# Patient Record
Sex: MALE | Race: WHITE | Employment: FULL TIME | URBAN - METROPOLITAN AREA
[De-identification: names, ages, dates, MRNs, and addresses within clinical notes are randomized per-mention and may not be internally consistent; named-entity substitution may affect disease eponyms.]

---

## 2024-03-06 ENCOUNTER — HOSPITAL ENCOUNTER (OUTPATIENT)
Age: 44
Discharge: HOME | End: 2024-03-06
Payer: COMMERCIAL

## 2024-03-06 VITALS
TEMPERATURE: 98 F | HEART RATE: 108 BPM | RESPIRATION RATE: 18 BRPM | SYSTOLIC BLOOD PRESSURE: 137 MMHG | DIASTOLIC BLOOD PRESSURE: 89 MMHG | OXYGEN SATURATION: 97 %

## 2024-03-06 DIAGNOSIS — J02.9 VIRAL PHARYNGITIS: Primary | ICD-10-CM

## 2024-03-06 LAB — S PYO AG THROAT QL: NEGATIVE

## 2024-03-06 NOTE — DISCHARGE INSTRUCTIONS
Please take acetaminophen (Tylenol) 650-1000 mg every 6 hours for fever/pain  OR  Ibuprofen (Motrin, Advil) 600 mg every 6 hours for fever/pain, with food  Warm fluids  Throat drops/lozenges e.g. Halls, Cepacol  Warm salt water gargles (1/2 teaspoon of salt to 8 oz of warm water)  Return for any new/worsening symptoms  See your primary care provider if no improvement in 1 week

## 2024-03-06 NOTE — ED PROVIDER NOTES
Chief Complaint   Patient presents with    Sore Throat       HPI:     Shun Richardson is a 43 year old male who presents for evaluation and management of a chief complaint of sore throat for 2 days.  No fever.  No cough.  No nasal congestion.  No chest pain or shortness of breath.  No nausea, vomiting, diarrhea, abdominal pain.  Tolerating p.o.    PFSH  PFSH asessment screens reviewed and agree.  Nursing note reviewed and I agree with documentation.    No family history on file.  Family history reviewed with patient/caregiver and is not pertinent to presenting problem.  Social History     Socioeconomic History    Marital status: Single     Spouse name: Not on file    Number of children: Not on file    Years of education: Not on file    Highest education level: Not on file   Occupational History    Not on file   Tobacco Use    Smoking status: Not on file    Smokeless tobacco: Not on file   Substance and Sexual Activity    Alcohol use: Not on file    Drug use: Not on file    Sexual activity: Not on file   Other Topics Concern    Not on file   Social History Narrative    Not on file     Social Determinants of Health     Financial Resource Strain: Not on file   Food Insecurity: Not on file   Transportation Needs: Not on file   Physical Activity: Not on file   Stress: Not on file   Social Connections: Not on file   Housing Stability: Not on file        Findings:    /89   Pulse 108   Temp 97.9 °F (36.6 °C)   Resp 18   SpO2 97%   GENERAL: well developed, well nourished, well hydrated, no distress  HEAD: normocephalic  NECK: supple, no adenopathy  EYES: sclera non icteric bilateral, conjunctiva clear  EARS: TM  bilateral: normal  NOSE: nasal turbinates: pink, normal mucosa  THROAT: redness noted. R tonsil 2+, L tonsil 1+. Bilateral exudates noted. No PTA, uvula midline.  CARDIO: RRR without murmur  LUNGS: clear to auscultation bilaterally; no rales, rhonchi, or wheezes  SKIN: good skin turgor, no obvious  jasiel    MDM/Assessment/Plan:   Orders for this encounter:  Orders Placed This Encounter    POCT Rapid Strep    POCT Rapid Strep       Labs performed this visit:  Recent Results (from the past 10 hour(s))   POCT Rapid Strep    Collection Time: 03/06/24  8:48 AM   Result Value Ref Range    POCT Rapid Strep Negative Negative       MDM:   Medical Decision Making  Differentials include: Strep pharyngitis, viral pharyngitis, peritonsillar abscess.    HPI and exam consistent with viral pharyngitis.  Strep test negative today.  Declined COVID and flu testing. Patient is tolerating p.o., no sign of peritonsillar abscess on exam.  Discussed supportive care including Tylenol, Ibuprofen, fluids, cepacol.  Return precautions were discussed.  Advised follow-up with primary care provider if no improvement in 1 week. Patient verbalized understanding and agreeable to plan of care.         Diagnosis:    ICD-10-CM    1. Viral pharyngitis  J02.9           All results reviewed and discussed with patient.  See AVS for detailed discharge instructions for your condition today.    Follow Up with:  Ian Lu MD  [address] [place] [address]  [phone]    Call

## (undated) NOTE — LETTER
Date & Time: 3/6/2024, 8:55 AM  Patient: Shun Richardson  Encounter Provider(s):    Ashwin Christensen APRN       To Whom It May Concern:    Shun Richardson was seen and treated in our department on 3/6/2024. He can return to work tomorrow 3/7/2024.    If you have any questions or concerns, please do not hesitate to call.      SAVAGE KuhnP-BC  _____________________________  Physician/APC Signature